# Patient Record
Sex: MALE
[De-identification: names, ages, dates, MRNs, and addresses within clinical notes are randomized per-mention and may not be internally consistent; named-entity substitution may affect disease eponyms.]

---

## 2019-11-21 ENCOUNTER — HOSPITAL ENCOUNTER (OUTPATIENT)
Dept: HOSPITAL 88 - OR | Age: 39
Discharge: HOME | End: 2019-11-21
Attending: INTERNAL MEDICINE
Payer: COMMERCIAL

## 2019-11-21 VITALS — DIASTOLIC BLOOD PRESSURE: 80 MMHG | SYSTOLIC BLOOD PRESSURE: 126 MMHG

## 2019-11-21 DIAGNOSIS — E66.01: ICD-10-CM

## 2019-11-21 DIAGNOSIS — I10: ICD-10-CM

## 2019-11-21 DIAGNOSIS — B96.81: ICD-10-CM

## 2019-11-21 DIAGNOSIS — Z80.0: ICD-10-CM

## 2019-11-21 DIAGNOSIS — K29.50: Primary | ICD-10-CM

## 2019-11-21 DIAGNOSIS — R16.0: ICD-10-CM

## 2019-11-21 DIAGNOSIS — R94.5: ICD-10-CM

## 2019-11-21 DIAGNOSIS — R16.1: ICD-10-CM

## 2019-11-21 DIAGNOSIS — Z01.810: ICD-10-CM

## 2019-11-21 PROCEDURE — 43235 EGD DIAGNOSTIC BRUSH WASH: CPT

## 2019-11-21 PROCEDURE — 93005 ELECTROCARDIOGRAM TRACING: CPT

## 2019-11-21 PROCEDURE — 43239 EGD BIOPSY SINGLE/MULTIPLE: CPT

## 2019-11-21 NOTE — XMS REPORT
Patient Summary Document

                             Created on: 2019



CAROL YANEZ

External Reference #: [id number]

: 1980

Sex: Male



Demographics







                          Address                   [address] [place]  [address]

 

                          Home Phone                [phone]

 

                          Preferred Language        Unknown

 

                          Marital Status            Unknown

 

                          Druze Affiliation     Unknown

 

                          Race                      Unknown

 

                                        Additional Race(s)  

 

                          Ethnic Group              Unknown





Author







                          Author                    [facility]

 

                          [facility]

 

                          Address                   Unknown

 

                          Phone                     Unavailable







Support







                Name            Relationship    Address         Phone

 

                    NO,  ONE            PRS                 [address] [place], [place]  [address] [phone]

 

                    NO,  ONE            PRS                 [address] [place], [place]  [address] [phone]







Care Team Providers







                    Care Team Member Name    Role                Phone

 

                          Unavailable               Unavailable







Payers







             Payer Name    Policy Type    Policy Number    Effective Date    Expiration Date







Problems

This patient has no known problems.



Allergies, Adverse Reactions, Alerts

This patient has no known allergies or adverse reactions.



Medications

This patient has no known medications.



Encounters







             Start Date/Time    End Date/Time    Encounter Type    Admission Type    Attending Clinicians

                    Care Facility       Care Department     Encounter ID

 

        2018 20:42:53    2018 20:42:53    Emergency                    [facility]     [id number]

 

        2018 20:03:41    2018 20:03:41    Emergency                    [facility]     MED     [id number]

 

        2017-10-14 19:14:54    2017-10-14 19:14:54    Emergency                    [facility]     MED     [id number]